# Patient Record
Sex: FEMALE | Race: BLACK OR AFRICAN AMERICAN | NOT HISPANIC OR LATINO | Employment: UNEMPLOYED | URBAN - METROPOLITAN AREA
[De-identification: names, ages, dates, MRNs, and addresses within clinical notes are randomized per-mention and may not be internally consistent; named-entity substitution may affect disease eponyms.]

---

## 2018-08-06 ENCOUNTER — TELEPHONE (OUTPATIENT)
Dept: OBSTETRICS AND GYNECOLOGY | Facility: CLINIC | Age: 18
End: 2018-08-06

## 2018-08-06 NOTE — TELEPHONE ENCOUNTER
----- Message from Alayna Flores sent at 8/6/2018 10:15 AM CDT -----  Contact: pt  Pt is requesting a call back from the nurse in regards to pt getting a pregnancy confirmation visit Set up I don't have any slots. Pt states that she found out on father's day that she was pregnant   [phone] (home) or [phone]

## 2018-08-06 NOTE — TELEPHONE ENCOUNTER
Left a message for the patient informing her that we can get her in at the [place] location on 8/8/18 @ 10:20 am with Mrs Martin and if she needs to reschedule she can call the appointment desk at [phone].